# Patient Record
Sex: MALE | Race: WHITE | NOT HISPANIC OR LATINO | URBAN - METROPOLITAN AREA
[De-identification: names, ages, dates, MRNs, and addresses within clinical notes are randomized per-mention and may not be internally consistent; named-entity substitution may affect disease eponyms.]

---

## 2021-04-15 ENCOUNTER — INPATIENT (INPATIENT)
Facility: HOSPITAL | Age: 56
LOS: 0 days | Discharge: HOME | DRG: 309 | End: 2021-04-16
Attending: INTERNAL MEDICINE | Admitting: INTERNAL MEDICINE
Payer: COMMERCIAL

## 2021-04-15 VITALS
SYSTOLIC BLOOD PRESSURE: 91 MMHG | HEIGHT: 69 IN | WEIGHT: 309.97 LBS | TEMPERATURE: 98 F | OXYGEN SATURATION: 97 % | RESPIRATION RATE: 18 BRPM | DIASTOLIC BLOOD PRESSURE: 70 MMHG | HEART RATE: 92 BPM

## 2021-04-15 DIAGNOSIS — Z98.89 OTHER SPECIFIED POSTPROCEDURAL STATES: Chronic | ICD-10-CM

## 2021-04-15 DIAGNOSIS — Z29.9 ENCOUNTER FOR PROPHYLACTIC MEASURES, UNSPECIFIED: ICD-10-CM

## 2021-04-15 DIAGNOSIS — I48.0 PAROXYSMAL ATRIAL FIBRILLATION: ICD-10-CM

## 2021-04-15 DIAGNOSIS — I48.91 UNSPECIFIED ATRIAL FIBRILLATION: ICD-10-CM

## 2021-04-15 DIAGNOSIS — G47.33 OBSTRUCTIVE SLEEP APNEA (ADULT) (PEDIATRIC): ICD-10-CM

## 2021-04-15 DIAGNOSIS — E78.5 HYPERLIPIDEMIA, UNSPECIFIED: ICD-10-CM

## 2021-04-15 DIAGNOSIS — I10 ESSENTIAL (PRIMARY) HYPERTENSION: ICD-10-CM

## 2021-04-15 LAB
ALBUMIN SERPL ELPH-MCNC: 3.7 G/DL — SIGNIFICANT CHANGE UP (ref 3.3–5)
ALP SERPL-CCNC: 76 U/L — SIGNIFICANT CHANGE UP (ref 40–120)
ALT FLD-CCNC: 26 U/L — SIGNIFICANT CHANGE UP (ref 12–78)
ANION GAP SERPL CALC-SCNC: 7 MMOL/L — SIGNIFICANT CHANGE UP (ref 5–17)
AST SERPL-CCNC: 14 U/L — LOW (ref 15–37)
BASOPHILS # BLD AUTO: 0.09 K/UL — SIGNIFICANT CHANGE UP (ref 0–0.2)
BASOPHILS NFR BLD AUTO: 0.8 % — SIGNIFICANT CHANGE UP (ref 0–2)
BILIRUB SERPL-MCNC: 1.1 MG/DL — SIGNIFICANT CHANGE UP (ref 0.2–1.2)
BUN SERPL-MCNC: 16 MG/DL — SIGNIFICANT CHANGE UP (ref 7–23)
CALCIUM SERPL-MCNC: 9 MG/DL — SIGNIFICANT CHANGE UP (ref 8.5–10.1)
CHLORIDE SERPL-SCNC: 107 MMOL/L — SIGNIFICANT CHANGE UP (ref 96–108)
CK SERPL-CCNC: 92 U/L — SIGNIFICANT CHANGE UP (ref 26–308)
CO2 SERPL-SCNC: 28 MMOL/L — SIGNIFICANT CHANGE UP (ref 22–31)
CREAT SERPL-MCNC: 1.1 MG/DL — SIGNIFICANT CHANGE UP (ref 0.5–1.3)
D DIMER BLD IA.RAPID-MCNC: <150 NG/ML DDU — SIGNIFICANT CHANGE UP
EOSINOPHIL # BLD AUTO: 0.22 K/UL — SIGNIFICANT CHANGE UP (ref 0–0.5)
EOSINOPHIL NFR BLD AUTO: 2 % — SIGNIFICANT CHANGE UP (ref 0–6)
GLUCOSE SERPL-MCNC: 101 MG/DL — HIGH (ref 70–99)
HCT VFR BLD CALC: 44 % — SIGNIFICANT CHANGE UP (ref 39–50)
HGB BLD-MCNC: 14.8 G/DL — SIGNIFICANT CHANGE UP (ref 13–17)
IMM GRANULOCYTES NFR BLD AUTO: 0.5 % — SIGNIFICANT CHANGE UP (ref 0–1.5)
LYMPHOCYTES # BLD AUTO: 1.86 K/UL — SIGNIFICANT CHANGE UP (ref 1–3.3)
LYMPHOCYTES # BLD AUTO: 17 % — SIGNIFICANT CHANGE UP (ref 13–44)
MCHC RBC-ENTMCNC: 28.4 PG — SIGNIFICANT CHANGE UP (ref 27–34)
MCHC RBC-ENTMCNC: 33.6 GM/DL — SIGNIFICANT CHANGE UP (ref 32–36)
MCV RBC AUTO: 84.3 FL — SIGNIFICANT CHANGE UP (ref 80–100)
MONOCYTES # BLD AUTO: 0.64 K/UL — SIGNIFICANT CHANGE UP (ref 0–0.9)
MONOCYTES NFR BLD AUTO: 5.9 % — SIGNIFICANT CHANGE UP (ref 2–14)
NEUTROPHILS # BLD AUTO: 8.05 K/UL — HIGH (ref 1.8–7.4)
NEUTROPHILS NFR BLD AUTO: 73.8 % — SIGNIFICANT CHANGE UP (ref 43–77)
NRBC # BLD: 0 /100 WBCS — SIGNIFICANT CHANGE UP (ref 0–0)
PLATELET # BLD AUTO: 257 K/UL — SIGNIFICANT CHANGE UP (ref 150–400)
POTASSIUM SERPL-MCNC: 3.7 MMOL/L — SIGNIFICANT CHANGE UP (ref 3.5–5.3)
POTASSIUM SERPL-SCNC: 3.7 MMOL/L — SIGNIFICANT CHANGE UP (ref 3.5–5.3)
PROT SERPL-MCNC: 7.7 G/DL — SIGNIFICANT CHANGE UP (ref 6–8.3)
RBC # BLD: 5.22 M/UL — SIGNIFICANT CHANGE UP (ref 4.2–5.8)
RBC # FLD: 13.4 % — SIGNIFICANT CHANGE UP (ref 10.3–14.5)
SARS-COV-2 RNA SPEC QL NAA+PROBE: SIGNIFICANT CHANGE UP
SODIUM SERPL-SCNC: 142 MMOL/L — SIGNIFICANT CHANGE UP (ref 135–145)
TROPONIN I SERPL-MCNC: 0.02 NG/ML — SIGNIFICANT CHANGE UP (ref 0.01–0.04)
WBC # BLD: 10.91 K/UL — HIGH (ref 3.8–10.5)
WBC # FLD AUTO: 10.91 K/UL — HIGH (ref 3.8–10.5)

## 2021-04-15 PROCEDURE — 93010 ELECTROCARDIOGRAM REPORT: CPT

## 2021-04-15 PROCEDURE — 71045 X-RAY EXAM CHEST 1 VIEW: CPT | Mod: 26

## 2021-04-15 PROCEDURE — 99222 1ST HOSP IP/OBS MODERATE 55: CPT

## 2021-04-15 PROCEDURE — 99285 EMERGENCY DEPT VISIT HI MDM: CPT

## 2021-04-15 RX ORDER — METOPROLOL TARTRATE 50 MG
1 TABLET ORAL
Qty: 0 | Refills: 0 | DISCHARGE

## 2021-04-15 RX ORDER — FERROUS SULFATE 325(65) MG
325 TABLET ORAL DAILY
Refills: 0 | Status: DISCONTINUED | OUTPATIENT
Start: 2021-04-15 | End: 2021-04-16

## 2021-04-15 RX ORDER — KETOTIFEN FUMARATE 0.34 MG/ML
1 SOLUTION OPHTHALMIC
Refills: 0 | Status: DISCONTINUED | OUTPATIENT
Start: 2021-04-15 | End: 2021-04-16

## 2021-04-15 RX ORDER — DIGOXIN 250 MCG
500 TABLET ORAL ONCE
Refills: 0 | Status: COMPLETED | OUTPATIENT
Start: 2021-04-15 | End: 2021-04-15

## 2021-04-15 RX ORDER — DILTIAZEM HCL 120 MG
10 CAPSULE, EXT RELEASE 24 HR ORAL ONCE
Refills: 0 | Status: COMPLETED | OUTPATIENT
Start: 2021-04-15 | End: 2021-04-15

## 2021-04-15 RX ORDER — ASPIRIN/CALCIUM CARB/MAGNESIUM 324 MG
81 TABLET ORAL DAILY
Refills: 0 | Status: DISCONTINUED | OUTPATIENT
Start: 2021-04-15 | End: 2021-04-16

## 2021-04-15 RX ORDER — AMLODIPINE BESYLATE 2.5 MG/1
1 TABLET ORAL
Qty: 0 | Refills: 0 | DISCHARGE

## 2021-04-15 RX ORDER — ASPIRIN/CALCIUM CARB/MAGNESIUM 324 MG
1 TABLET ORAL
Qty: 0 | Refills: 0 | DISCHARGE

## 2021-04-15 RX ORDER — FERROUS SULFATE 325(65) MG
1 TABLET ORAL
Qty: 0 | Refills: 0 | DISCHARGE

## 2021-04-15 RX ORDER — SODIUM CHLORIDE 9 MG/ML
500 INJECTION INTRAMUSCULAR; INTRAVENOUS; SUBCUTANEOUS
Refills: 0 | Status: COMPLETED | OUTPATIENT
Start: 2021-04-15 | End: 2021-04-15

## 2021-04-15 RX ORDER — DOXAZOSIN MESYLATE 4 MG
2 TABLET ORAL AT BEDTIME
Refills: 0 | Status: DISCONTINUED | OUTPATIENT
Start: 2021-04-15 | End: 2021-04-15

## 2021-04-15 RX ORDER — ATORVASTATIN CALCIUM 80 MG/1
80 TABLET, FILM COATED ORAL AT BEDTIME
Refills: 0 | Status: DISCONTINUED | OUTPATIENT
Start: 2021-04-15 | End: 2021-04-16

## 2021-04-15 RX ORDER — LOSARTAN POTASSIUM 100 MG/1
100 TABLET, FILM COATED ORAL DAILY
Refills: 0 | Status: DISCONTINUED | OUTPATIENT
Start: 2021-04-15 | End: 2021-04-15

## 2021-04-15 RX ORDER — TELMISARTAN AND HYDROCHLOROTHIAZIDE 40; 12.5 MG/1; MG/1
1 TABLET ORAL
Qty: 0 | Refills: 0 | DISCHARGE

## 2021-04-15 RX ORDER — DOXAZOSIN MESYLATE 4 MG
1 TABLET ORAL
Qty: 0 | Refills: 0 | DISCHARGE

## 2021-04-15 RX ORDER — ATORVASTATIN CALCIUM 80 MG/1
1 TABLET, FILM COATED ORAL
Qty: 0 | Refills: 0 | DISCHARGE

## 2021-04-15 RX ORDER — DILTIAZEM HCL 120 MG
15 CAPSULE, EXT RELEASE 24 HR ORAL
Qty: 125 | Refills: 0 | Status: DISCONTINUED | OUTPATIENT
Start: 2021-04-15 | End: 2021-04-16

## 2021-04-15 RX ORDER — APIXABAN 2.5 MG/1
5 TABLET, FILM COATED ORAL EVERY 12 HOURS
Refills: 0 | Status: DISCONTINUED | OUTPATIENT
Start: 2021-04-15 | End: 2021-04-16

## 2021-04-15 RX ORDER — DIGOXIN 250 MCG
250 TABLET ORAL EVERY 6 HOURS
Refills: 0 | Status: COMPLETED | OUTPATIENT
Start: 2021-04-15 | End: 2021-04-15

## 2021-04-15 RX ORDER — SODIUM CHLORIDE 9 MG/ML
1000 INJECTION INTRAMUSCULAR; INTRAVENOUS; SUBCUTANEOUS ONCE
Refills: 0 | Status: COMPLETED | OUTPATIENT
Start: 2021-04-15 | End: 2021-04-15

## 2021-04-15 RX ORDER — ACETAMINOPHEN 500 MG
650 TABLET ORAL EVERY 6 HOURS
Refills: 0 | Status: DISCONTINUED | OUTPATIENT
Start: 2021-04-15 | End: 2021-04-16

## 2021-04-15 RX ORDER — HYDROCHLOROTHIAZIDE 25 MG
25 TABLET ORAL DAILY
Refills: 0 | Status: DISCONTINUED | OUTPATIENT
Start: 2021-04-15 | End: 2021-04-15

## 2021-04-15 RX ORDER — DILTIAZEM HCL 120 MG
10 CAPSULE, EXT RELEASE 24 HR ORAL
Qty: 125 | Refills: 0 | Status: DISCONTINUED | OUTPATIENT
Start: 2021-04-15 | End: 2021-04-15

## 2021-04-15 RX ORDER — OLOPATADINE HYDROCHLORIDE 1 MG/ML
1 SOLUTION/ DROPS OPHTHALMIC
Qty: 0 | Refills: 0 | DISCHARGE

## 2021-04-15 RX ORDER — METOPROLOL TARTRATE 50 MG
25 TABLET ORAL EVERY 6 HOURS
Refills: 0 | Status: DISCONTINUED | OUTPATIENT
Start: 2021-04-15 | End: 2021-04-16

## 2021-04-15 RX ADMIN — ATORVASTATIN CALCIUM 80 MILLIGRAM(S): 80 TABLET, FILM COATED ORAL at 21:28

## 2021-04-15 RX ADMIN — Medication 15 MG/HR: at 19:38

## 2021-04-15 RX ADMIN — Medication 15 MG/HR: at 16:10

## 2021-04-15 RX ADMIN — APIXABAN 5 MILLIGRAM(S): 2.5 TABLET, FILM COATED ORAL at 12:15

## 2021-04-15 RX ADMIN — Medication 10 MILLIGRAM(S): at 10:42

## 2021-04-15 RX ADMIN — Medication 250 MICROGRAM(S): at 18:11

## 2021-04-15 RX ADMIN — KETOTIFEN FUMARATE 1 DROP(S): 0.34 SOLUTION OPHTHALMIC at 18:11

## 2021-04-15 RX ADMIN — SODIUM CHLORIDE 1000 MILLILITER(S): 9 INJECTION INTRAMUSCULAR; INTRAVENOUS; SUBCUTANEOUS at 10:30

## 2021-04-15 RX ADMIN — Medication 500 MICROGRAM(S): at 11:20

## 2021-04-15 RX ADMIN — Medication 10 MILLIGRAM(S): at 10:25

## 2021-04-15 RX ADMIN — Medication 250 MICROGRAM(S): at 23:41

## 2021-04-15 RX ADMIN — SODIUM CHLORIDE 1000 MILLILITER(S): 9 INJECTION INTRAMUSCULAR; INTRAVENOUS; SUBCUTANEOUS at 14:25

## 2021-04-15 RX ADMIN — Medication 25 MILLIGRAM(S): at 18:12

## 2021-04-15 RX ADMIN — SODIUM CHLORIDE 1000 MILLILITER(S): 9 INJECTION INTRAMUSCULAR; INTRAVENOUS; SUBCUTANEOUS at 11:55

## 2021-04-15 RX ADMIN — Medication 10 MG/HR: at 10:50

## 2021-04-15 RX ADMIN — APIXABAN 5 MILLIGRAM(S): 2.5 TABLET, FILM COATED ORAL at 18:12

## 2021-04-15 NOTE — H&P ADULT - NSICDXFAMILYHX_GEN_ALL_CORE_FT
FAMILY HISTORY:  Sibling  Still living? Yes, Estimated age: Age Unknown  Family history of myocardial infarction, Age at diagnosis: Age Unknown

## 2021-04-15 NOTE — H&P ADULT - HISTORY OF PRESENT ILLNESS
Mr Willson is a 56 yo M presenting from home with palpitations found to have A fib. PMH SIMONE on CPAP, HTN, HLD  Patient seen and examined at bedside.       In ER patient was evaluated by cardiology. He was started on Eliquis.    Mr Willson is a 54 yo M presenting from home with palpitations found to have A fib. PMH SIMONE on CPAP, HTN, HLD  Patient seen and examined at bedside.   He states that at approx 3 am today he woke up with palpitations and a feeling of his heart racing. He went back to sleep and then when he woke up he had some dizziness and continued feelings of palpitations so came to ER for evaluation. He denies any known past hx of A fib. He also denies past hx of DM2/CHFMI/TIA/CVA. He denies any associated chest pain. He feels well currently aside form continued feeling of palpitations. Denies light-headedness, dizziness, headaches, nausea, vomiting, chest pain, SOB, abdominal pain, constipation, diarrhea, melena, hematochezia, dysuria.   In ER patient was evaluated by cardiology. He was started on Eliquis, received Digoxin, and started on Cardizem drip.

## 2021-04-15 NOTE — CONSULT NOTE ADULT - SUBJECTIVE AND OBJECTIVE BOX
History of Present Illness: The patient is a 55 year old male with a history of HTN, HL, SIMONE on CPAP who presents with palpitations. He noted the development of palpitations while in bed around 3 am. He went back to sleep and when he woke up, palpitations were still present. He noted some dizziness when he got up. No chest pain or shortness of breath. He sees Dr. Turner as outpatient. He had an echo 6-12 months ago that was normal.    Past Medical/Surgical History:  HTN, HL, SIMONE on CPAP    Medications:  Home Medications:  Benicar HCT 25 mg-40 mg oral tablet: 1 tab(s) orally once a day (15 Nov 2017 13:40)  Benicar HCT 25 mg-40 mg oral tablet: 1 tab(s) orally once a day (31 Jul 2014 02:49)  Lopressor:  orally  (31 Jul 2014 02:49)  metoprolol extended release 25 mg oral tablet, extended release: 1 tab(s) orally once a day (15 Nov 2017 13:40)  Norvasc 10 mg oral tablet: 1 tab(s) orally once a day (31 Jul 2014 02:49)  Norvasc 10 mg oral tablet: 1 tab(s) orally once a day (15 Nov 2017 13:40)  simvastatin 20 mg oral tablet: 1 tab(s) orally once a day (at bedtime) (15 Nov 2017 13:40)  simvastatin 40 mg oral tablet: 1 tab(s) orally once a day (at bedtime) (31 Jul 2014 02:49)      Family History: Non-contributory family history of premature cardiovascular atherosclerotic disease    Social History: No tobacco, alcohol or drug use    Review of Systems:  General: No fevers, chills, weight loss or gain  Skin: No rashes, color changes  Cardiovascular: No chest pain, orthopnea  Respiratory: No shortness of breath, cough  Gastrointestinal: No nausea, abdominal pain  Genitourinary: No incontinence, pain with urination  Musculoskeletal: No pain, swelling, decreased range of motion  Neurological: No headache, weakness  Psychiatric: No depression, anxiety  Endocrine: No weight loss or gain, increased thirst  All other systems are comprehensively negative.    Physical Exam:  Vitals:        Vital Signs Last 24 Hrs  T(C): 36.4 (15 Apr 2021 10:01), Max: 36.4 (15 Apr 2021 10:01)  T(F): 97.6 (15 Apr 2021 10:01), Max: 97.6 (15 Apr 2021 10:01)  HR: 138 (15 Apr 2021 11:25) (92 - 155)  BP: 95/59 (15 Apr 2021 11:25) (84/53 - 100/59)  BP(mean): --  RR: 18 (15 Apr 2021 11:25) (18 - 18)  SpO2: 96% (15 Apr 2021 11:25) (96% - 98%)  General: NAD  HEENT: MMM  Neck: No JVD, no carotid bruit  Lungs: CTAB  CV: RRR, nl S1/S2, no M/R/G  Abdomen: S/NT/ND, +BS  Extremities: No LE edema, no cyanosis  Neuro: AAOx3, non-focal  Skin: No rash    Labs:                        14.8   10.91 )-----------( 257      ( 15 Apr 2021 10:23 )             44.0     04-15    142  |  107  |  16  ----------------------------<  101<H>  3.7   |  28  |  1.10    Ca    9.0      15 Apr 2021 10:23    TPro  7.7  /  Alb  3.7  /  TBili  1.1  /  DBili  x   /  AST  14<L>  /  ALT  26  /  AlkPhos  76  04-15    CARDIAC MARKERS ( 15 Apr 2021 10:23 )  .023 ng/mL / x     / 92 U/L / x     / x              ECG: AF, nonspecific ST abnormality

## 2021-04-15 NOTE — H&P ADULT - NSICDXPASTMEDICALHX_GEN_ALL_CORE_FT
PAST MEDICAL HISTORY:  Acid reflux     Arthritis     Cholesterol blood decreased on meds    HLD (hyperlipidemia)     HTN (hypertension)     HTN (hypertension)     Obesity

## 2021-04-15 NOTE — ED PROVIDER NOTE - PROGRESS NOTE DETAILS
Dw Dr WILBRU Keller - should give dig 0.5 Pt seen by Dr WILBUR doty, admit to tele, he will start eliquis, will cont to monitor. Pt with some persistent tachycardia, will monitor.  Vito Walters ([facility]), will see pt to admit.

## 2021-04-15 NOTE — PATIENT PROFILE ADULT - VISION (WITH CORRECTIVE LENSES IF THE PATIENT USUALLY WEARS THEM):
English
Reading glasses/Partially impaired: cannot see medication labels or newsprint, but can see obstacles in path, and the surrounding layout; can count fingers at arm's length

## 2021-04-15 NOTE — PHARMACOTHERAPY INTERVENTION NOTE - COMMENTS
Patient on Eliquis 5 mg BID for atrial fibrillation. Reached out to MD (Dr. Keller) to discuss patient receiving stat dose. MD approved stat dose and ok with next dose being given at 1800 tonight. Also discussed patient's BMI = 45 kg/m2. In patient's with BMI >40 kg/m2, DOAC therapy not extensively studied and not generally recommended. MD aware and would like to continue with medication.

## 2021-04-15 NOTE — ED PROVIDER NOTE - CARE PLAN
Principal Discharge DX:	New onset atrial fibrillation  Secondary Diagnosis:	Rapid atrial fibrillation

## 2021-04-15 NOTE — H&P ADULT - ASSESSMENT
Mr Willson is a 56 yo M presenting from home with palpitations found to have A fib. PMH SIMONE on CPAP, HTN, HLD

## 2021-04-15 NOTE — CONSULT NOTE ADULT - SUBJECTIVE AND OBJECTIVE BOX
REASON FOR CONSULT: New onset afib w/ RVR    CONSULT REQUESTED BY:     Patient is a 55y old  Male Pmhx morbidly obese, HTN, HLD who presents with a chief complaint of A fib with RVR (15 Apr 2021 11:56). Pt states he woke up at 2am with palpitations. Denies sob, chest pain, fever, chills, N/V/D, or sick contacts. Will in ED, he was given Cardizem 10mg IVX1, Digoxin .5mg, and now on Cardizem drip. 's. Pt seen and examined. Appears comfortable. Critical care called for further management.          PAST MEDICAL & SURGICAL HISTORY:  HTN (hypertension)    Cholesterol blood decreased  on meds    HTN (hypertension)    Acid reflux    Arthritis    HLD (hyperlipidemia)    Obesity    S/P knee surgery  both    H/O knee surgery      Allergies    No Known Allergies    Intolerances      FAMILY HISTORY:  Family history of myocardial infarction (Sibling)  52        Review of Systems:  CONSTITUTIONAL: No fever, chills, or fatigue  EYES: No eye pain, visual disturbances, or discharge  ENMT:  No difficulty hearing, tinnitus, vertigo; No sinus or throat pain  NECK: No pain or stiffness  RESPIRATORY: No cough, wheezing, chills or hemoptysis; No shortness of breath  CARDIOVASCULAR: No chest pain, + palpitations, +dizziness, or leg swelling  GASTROINTESTINAL: No abdominal or epigastric pain. No nausea, vomiting, or hematemesis; No diarrhea or constipation. No melena or hematochezia.  GENITOURINARY: No dysuria, frequency, hematuria, or incontinence  NEUROLOGICAL: No headaches, memory loss, loss of strength, numbness, or tremors  SKIN: No itching, burning, rashes, or lesions   MUSCULOSKELETAL: No joint pain or swelling; No muscle, back, or extremity pain  PSYCHIATRIC: No depression, anxiety, mood swings, or difficulty sleeping      Medications:    digoxin  Injectable 250 MICROGram(s) IV Push every 6 hours  diltiazem Infusion 15 mG/Hr IV Continuous <Continuous>      acetaminophen   Tablet .. 650 milliGRAM(s) Oral every 6 hours PRN      apixaban 5 milliGRAM(s) Oral every 12 hours  aspirin  chewable 81 milliGRAM(s) Oral daily        atorvastatin 80 milliGRAM(s) Oral at bedtime    ferrous    sulfate 325 milliGRAM(s) Oral daily      ketotifen 0.025% Ophthalmic Solution 1 Drop(s) Both EYES two times a day            ICU Vital Signs Last 24 Hrs  T(C): 37.2 (15 Apr 2021 15:53), Max: 37.2 (15 Apr 2021 15:53)  T(F): 98.9 (15 Apr 2021 15:53), Max: 98.9 (15 Apr 2021 15:53)  HR: 115 (15 Apr 2021 15:53) (92 - 155)  BP: 114/64 (15 Apr 2021 15:53) (80/54 - 114/64)  BP(mean): --  ABP: --  ABP(mean): --  RR: 18 (15 Apr 2021 15:53) (18 - 18)  SpO2: 99% (15 Apr 2021 15:53) (95% - 99%)    Vital Signs Last 24 Hrs  T(C): 37.2 (15 Apr 2021 15:53), Max: 37.2 (15 Apr 2021 15:53)  T(F): 98.9 (15 Apr 2021 15:53), Max: 98.9 (15 Apr 2021 15:53)  HR: 115 (15 Apr 2021 15:53) (92 - 155)  BP: 114/64 (15 Apr 2021 15:53) (80/54 - 114/64)  BP(mean): --  RR: 18 (15 Apr 2021 15:53) (18 - 18)  SpO2: 99% (15 Apr 2021 15:53) (95% - 99%)        I&O's Detail    15 Apr 2021 07:01  -  15 Apr 2021 16:17  --------------------------------------------------------  IN:    sodium chloride 0.9%: 500 mL    Sodium Chloride 0.9% Bolus: 1000 mL  Total IN: 1500 mL    OUT:    Estimated Blood Loss (mL): 300 mL  Total OUT: 300 mL    Total NET: 1200 mL            LABS:                        14.8   10.91 )-----------( 257      ( 15 Apr 2021 10:23 )             44.0     04-15    142  |  107  |  16  ----------------------------<  101<H>  3.7   |  28  |  1.10    Ca    9.0      15 Apr 2021 10:23    TPro  7.7  /  Alb  3.7  /  TBili  1.1  /  DBili  x   /  AST  14<L>  /  ALT  26  /  AlkPhos  76  04-15      CARDIAC MARKERS ( 15 Apr 2021 10:23 )  .023 ng/mL / x     / 92 U/L / x     / x          CAPILLARY BLOOD GLUCOSE            CULTURES:      Physical Examination:    General: No acute distress.  Alert, oriented, interactive, nonfocal    HEENT: Pupils equal, reactive to light.  Symmetric.    PULM: Clear to auscultation bilaterally, no significant sputum production    CVS: tachycardic irreg     ABD: Soft, nondistended, nontender, normoactive bowel sounds, no masses    EXT: No edema, nontender    SKIN: Warm and well perfused, no rashes noted.    RADIOLOGY:   CXR>  Heart magnified by technique.    The lung fields and pleural surfaces are unremarkable.    Chest is similar to July 31, 2014.    IMPRESSION: No acute finding.    A/P 55y old  Male Pmhx morbidly obese, HTN, HLD who presents with a chief complaint of new onset Afib with RVR. Cardizem IV and Digoxin IV given with minimal response. BP stable while cardizem drip. No indication for Critical care at this time. Stable for Tele.  Cardio eval appreciated. Woud give lopressor 5mg IvX1 now, then 25mg PO q6hrs. Apixaban started. Plan as per Cardiology.     GOC discussed with patient.     Discussed w/ Dr Hackett                                                                                                *  36min encounter (Reviewing data, imaging, discussing with multidisciplinary team, non inclusive of procedures, discussing goals of care with patient/family)    REASON FOR CONSULT: New onset afib w/ RVR    CONSULT REQUESTED BY:     Patient is a 55y old  Male Pmhx morbidly obese, HTN, HLD who presents with a chief complaint of A fib with RVR (15 Apr 2021 11:56). Pt states he woke up at 2am with palpitations. Denies sob, chest pain, fever, chills, N/V/D, or sick contacts. Will in ED, he was given Cardizem 10mg IVX1, Digoxin .5mg, and now on Cardizem drip. 's. Pt seen and examined. Appears comfortable. Critical care called for further management.          PAST MEDICAL & SURGICAL HISTORY:  HTN (hypertension)    Cholesterol blood decreased  on meds    HTN (hypertension)    Acid reflux    Arthritis    HLD (hyperlipidemia)    Obesity    S/P knee surgery  both    H/O knee surgery      Allergies    No Known Allergies    Intolerances      FAMILY HISTORY:  Family history of myocardial infarction (Sibling)  52        Review of Systems:  CONSTITUTIONAL: No fever, chills, or fatigue  EYES: No eye pain, visual disturbances, or discharge  ENMT:  No difficulty hearing, tinnitus, vertigo; No sinus or throat pain  NECK: No pain or stiffness  RESPIRATORY: No cough, wheezing, chills or hemoptysis; No shortness of breath  CARDIOVASCULAR: No chest pain, + palpitations, +dizziness, or leg swelling  GASTROINTESTINAL: No abdominal or epigastric pain. No nausea, vomiting, or hematemesis; No diarrhea or constipation. No melena or hematochezia.  GENITOURINARY: No dysuria, frequency, hematuria, or incontinence  NEUROLOGICAL: No headaches, memory loss, loss of strength, numbness, or tremors  SKIN: No itching, burning, rashes, or lesions   MUSCULOSKELETAL: No joint pain or swelling; No muscle, back, or extremity pain  PSYCHIATRIC: No depression, anxiety, mood swings, or difficulty sleeping      Medications:    digoxin  Injectable 250 MICROGram(s) IV Push every 6 hours  diltiazem Infusion 15 mG/Hr IV Continuous <Continuous>      acetaminophen   Tablet .. 650 milliGRAM(s) Oral every 6 hours PRN      apixaban 5 milliGRAM(s) Oral every 12 hours  aspirin  chewable 81 milliGRAM(s) Oral daily        atorvastatin 80 milliGRAM(s) Oral at bedtime    ferrous    sulfate 325 milliGRAM(s) Oral daily      ketotifen 0.025% Ophthalmic Solution 1 Drop(s) Both EYES two times a day            ICU Vital Signs Last 24 Hrs  T(C): 37.2 (15 Apr 2021 15:53), Max: 37.2 (15 Apr 2021 15:53)  T(F): 98.9 (15 Apr 2021 15:53), Max: 98.9 (15 Apr 2021 15:53)  HR: 115 (15 Apr 2021 15:53) (92 - 155)  BP: 114/64 (15 Apr 2021 15:53) (80/54 - 114/64)  BP(mean): --  ABP: --  ABP(mean): --  RR: 18 (15 Apr 2021 15:53) (18 - 18)  SpO2: 99% (15 Apr 2021 15:53) (95% - 99%)    Vital Signs Last 24 Hrs  T(C): 37.2 (15 Apr 2021 15:53), Max: 37.2 (15 Apr 2021 15:53)  T(F): 98.9 (15 Apr 2021 15:53), Max: 98.9 (15 Apr 2021 15:53)  HR: 115 (15 Apr 2021 15:53) (92 - 155)  BP: 114/64 (15 Apr 2021 15:53) (80/54 - 114/64)  BP(mean): --  RR: 18 (15 Apr 2021 15:53) (18 - 18)  SpO2: 99% (15 Apr 2021 15:53) (95% - 99%)        I&O's Detail    15 Apr 2021 07:01  -  15 Apr 2021 16:17  --------------------------------------------------------  IN:    sodium chloride 0.9%: 500 mL    Sodium Chloride 0.9% Bolus: 1000 mL  Total IN: 1500 mL    OUT:    Estimated Blood Loss (mL): 300 mL  Total OUT: 300 mL    Total NET: 1200 mL            LABS:                        14.8   10.91 )-----------( 257      ( 15 Apr 2021 10:23 )             44.0     04-15    142  |  107  |  16  ----------------------------<  101<H>  3.7   |  28  |  1.10    Ca    9.0      15 Apr 2021 10:23    TPro  7.7  /  Alb  3.7  /  TBili  1.1  /  DBili  x   /  AST  14<L>  /  ALT  26  /  AlkPhos  76  04-15      CARDIAC MARKERS ( 15 Apr 2021 10:23 )  .023 ng/mL / x     / 92 U/L / x     / x          CAPILLARY BLOOD GLUCOSE            CULTURES:      Physical Examination:    General: No acute distress.  Alert, oriented, interactive, nonfocal    HEENT: Pupils equal, reactive to light.  Symmetric.    PULM: Clear to auscultation bilaterally, no significant sputum production    CVS: tachycardic irreg     ABD: Soft, nondistended, nontender, normoactive bowel sounds, no masses    EXT: No edema, nontender    SKIN: Warm and well perfused, no rashes noted.    RADIOLOGY:   CXR>  Heart magnified by technique.    The lung fields and pleural surfaces are unremarkable.    Chest is similar to July 31, 2014.    IMPRESSION: No acute finding.    A/P 55y old  Male Pmhx morbidly obese, HTN, HLD who presents with a chief complaint of new onset Afib with RVR. Cardizem IV and Digoxin IV given with minimal response. BP stable while cardizem drip. No indication for Critical care at this time. Stable for Tele.  Cardio eval appreciated. Woud give lopressor 5mg IvX1 now, then 25mg PO q6hrs. Apixaban started. Plan as per Cardiology.     GOC discussed with patient.     Discussed w/ Dr Hackett and Dr Walters                                                                                                *  36min encounter (Reviewing data, imaging, discussing with multidisciplinary team, non inclusive of procedures, discussing goals of care with patient/family)

## 2021-04-15 NOTE — ED PROVIDER NOTE - CLINICAL SUMMARY MEDICAL DECISION MAKING FREE TEXT BOX
Palpitations with slight dizziness - HR ~ 160s, rapid afib (new onset). check labs, xr, dimer, cardio, tba

## 2021-04-15 NOTE — ED PROVIDER NOTE - OBJECTIVE STATEMENT
56 yo M p/w co palpitations since ~ 2am today. Pt went back to sleep and awoke this am , still feeling these sx. No chest pain. no sob. pt with slight dizziness when standing quickly. no numb/ting/focal weak. no recent illness. no abd pain. no n/v/d. No neck / back pain. no weakness. No prior events. no neck / back pain. Pt fully vaccinated to covid. no recent exposures. no agg/allev factors. no other inj or co.

## 2021-04-15 NOTE — PATIENT PROFILE ADULT - FALL HARM RISK
Arthritis in the bilateral knees/bones(Osteoporosis,prev fx,steroid use,metastatic bone ca)/coagulation(Bleeding disorder R/T clinical cond/anti-coags)

## 2021-04-15 NOTE — H&P ADULT - NSICDXPROBLEMPLAN1_GEN_ALL_CORE_FT
New Onset A fib.   -started on Cardizem gtt.  -Patient was hypotensive but without any symptoms of chest pain, light-headedness, dizziness.   -plan to transition to Metoprolol as recommended by cardiology once BP improves  -started on Eliquis  -risks and benefits discussed  -monitor on telemetry  -check TTE and TSH New Onset A fib.   -started on Cardizem gtt.  -Patient was hypotensive but without any symptoms of chest pain, light-headedness, dizziness.   -plan to transition to Metoprolol as recommended by cardiology once BP improves  -started on Eliquis  -risks and benefits discussed  -monitor on telemetry  -check TTE and TSH  -will obtain ICU consult due to persistent hypotension and bolus additional 500cc Normal saline. patient remains asymptomatic despite being hypotensive

## 2021-04-15 NOTE — ED ADULT NURSE NOTE - OBJECTIVE STATEMENT
Pt A&Ox4, ambulatory to ED c/o palpitations.  Pt states he woke up today around 0200 and felt as if his heart was racing.  Pt denies chest pain, N/V, back pain, shortness of breath.  Pt has been experiencing dizziness with standing.

## 2021-04-15 NOTE — CONSULT NOTE ADULT - ASSESSMENT
The patient is a 55 year old male with a history of HTN, HL, SIMONE on CPAP who presents with palpitations in the setting of new rapid atrial fibrillation.    Plan:  - ECG otherwise with nonspecific findings  - D-dimer negative  - Cardiac enzymes negative  - Patient received diltiazem 20 mg IV with little effect  - Continue diltiazem drip at 10 mg/hr and titrate up as tolerated  - BP on lower side  - Received digoxin 0.5 mg IV. Will continue load with digoxin 0.25 mg IV q6h x2 doses.  - If SBP improves greater than 100 mmHg, start metoprolol tartrate 25 mg q6h  - Start apixaban 5 mg bid  - Check echo  - Check TSH

## 2021-04-15 NOTE — H&P ADULT - NSHPPHYSICALEXAM_GEN_ALL_CORE
T(C): 36.4 (04-15-21 @ 10:01), Max: 36.4 (04-15-21 @ 10:01)  HR: 136 (04-15-21 @ 11:55) (92 - 155)  BP: 80/54 (04-15-21 @ 11:55) (80/54 - 100/59)  RR: 18 (04-15-21 @ 11:55) (18 - 18)  SpO2: 98% (04-15-21 @ 11:55) (96% - 98%)  Wt(kg): --    Physical Exam:   GENERAL: well-groomed, well-developed, NAD  HEENT: head NC/AT; EOM intact, conjunctiva & sclera clear; hearing grossly intact, moist mucous membranes  NECK: supple, no JVD  RESPIRATORY: CTA B/L, no wheezing, rales, rhonchi or rubs  CARDIOVASCULAR: S1&S2, +tachy, irreg irreg, no murmurs or gallops  ABDOMEN: soft, non-tender, non-distended, + Bowel sounds x4 quadrants, no guarding, rebound or rigidity  MUSCULOSKELETAL:  no clubbing, cyanosis or edema of all 4 extremities  LYMPH: no cervical lymphadenopathy  VASCULAR: Radial pulses 2+ bilaterally, no varicose veins   SKIN: warm and dry, color normal  NEUROLOGIC: AA&O X3, CN2-12 intact w/ no focal deficits, no sensory loss, motor Strength 5/5 in UE & LE B/L  Psych: Normal mood and affect, normal behavior

## 2021-04-15 NOTE — ED ADULT NURSE NOTE - PMH
Acid reflux    Arthritis    Cholesterol blood decreased  on meds  HLD (hyperlipidemia)    HTN (hypertension)    HTN (hypertension)    Obesity

## 2021-04-16 VITALS
TEMPERATURE: 98 F | HEART RATE: 61 BPM | OXYGEN SATURATION: 93 % | DIASTOLIC BLOOD PRESSURE: 71 MMHG | RESPIRATION RATE: 20 BRPM | SYSTOLIC BLOOD PRESSURE: 111 MMHG

## 2021-04-16 LAB
ANION GAP SERPL CALC-SCNC: 8 MMOL/L — SIGNIFICANT CHANGE UP (ref 5–17)
BASOPHILS # BLD AUTO: 0.1 K/UL — SIGNIFICANT CHANGE UP (ref 0–0.2)
BASOPHILS NFR BLD AUTO: 1.1 % — SIGNIFICANT CHANGE UP (ref 0–2)
BUN SERPL-MCNC: 14 MG/DL — SIGNIFICANT CHANGE UP (ref 7–23)
CALCIUM SERPL-MCNC: 8.3 MG/DL — LOW (ref 8.5–10.1)
CHLORIDE SERPL-SCNC: 110 MMOL/L — HIGH (ref 96–108)
CO2 SERPL-SCNC: 25 MMOL/L — SIGNIFICANT CHANGE UP (ref 22–31)
COVID-19 SPIKE DOMAIN AB INTERP: POSITIVE
COVID-19 SPIKE DOMAIN ANTIBODY RESULT: >250 U/ML — HIGH
CREAT SERPL-MCNC: 0.83 MG/DL — SIGNIFICANT CHANGE UP (ref 0.5–1.3)
EOSINOPHIL # BLD AUTO: 0.25 K/UL — SIGNIFICANT CHANGE UP (ref 0–0.5)
EOSINOPHIL NFR BLD AUTO: 2.7 % — SIGNIFICANT CHANGE UP (ref 0–6)
GLUCOSE SERPL-MCNC: 88 MG/DL — SIGNIFICANT CHANGE UP (ref 70–99)
HCT VFR BLD CALC: 42.1 % — SIGNIFICANT CHANGE UP (ref 39–50)
HCV AB S/CO SERPL IA: 0.09 S/CO — SIGNIFICANT CHANGE UP (ref 0–0.99)
HCV AB SERPL-IMP: SIGNIFICANT CHANGE UP
HGB BLD-MCNC: 14.1 G/DL — SIGNIFICANT CHANGE UP (ref 13–17)
IMM GRANULOCYTES NFR BLD AUTO: 0.3 % — SIGNIFICANT CHANGE UP (ref 0–1.5)
LYMPHOCYTES # BLD AUTO: 2.07 K/UL — SIGNIFICANT CHANGE UP (ref 1–3.3)
LYMPHOCYTES # BLD AUTO: 22.3 % — SIGNIFICANT CHANGE UP (ref 13–44)
MCHC RBC-ENTMCNC: 28.7 PG — SIGNIFICANT CHANGE UP (ref 27–34)
MCHC RBC-ENTMCNC: 33.5 GM/DL — SIGNIFICANT CHANGE UP (ref 32–36)
MCV RBC AUTO: 85.6 FL — SIGNIFICANT CHANGE UP (ref 80–100)
MONOCYTES # BLD AUTO: 0.52 K/UL — SIGNIFICANT CHANGE UP (ref 0–0.9)
MONOCYTES NFR BLD AUTO: 5.6 % — SIGNIFICANT CHANGE UP (ref 2–14)
NEUTROPHILS # BLD AUTO: 6.31 K/UL — SIGNIFICANT CHANGE UP (ref 1.8–7.4)
NEUTROPHILS NFR BLD AUTO: 68 % — SIGNIFICANT CHANGE UP (ref 43–77)
NRBC # BLD: 0 /100 WBCS — SIGNIFICANT CHANGE UP (ref 0–0)
PLATELET # BLD AUTO: 217 K/UL — SIGNIFICANT CHANGE UP (ref 150–400)
POTASSIUM SERPL-MCNC: 3.8 MMOL/L — SIGNIFICANT CHANGE UP (ref 3.5–5.3)
POTASSIUM SERPL-SCNC: 3.8 MMOL/L — SIGNIFICANT CHANGE UP (ref 3.5–5.3)
RBC # BLD: 4.92 M/UL — SIGNIFICANT CHANGE UP (ref 4.2–5.8)
RBC # FLD: 13.5 % — SIGNIFICANT CHANGE UP (ref 10.3–14.5)
SARS-COV-2 IGG+IGM SERPL QL IA: >250 U/ML — HIGH
SARS-COV-2 IGG+IGM SERPL QL IA: POSITIVE
SODIUM SERPL-SCNC: 143 MMOL/L — SIGNIFICANT CHANGE UP (ref 135–145)
TSH SERPL-MCNC: 2.18 UIU/ML — SIGNIFICANT CHANGE UP (ref 0.36–3.74)
WBC # BLD: 9.28 K/UL — SIGNIFICANT CHANGE UP (ref 3.8–10.5)
WBC # FLD AUTO: 9.28 K/UL — SIGNIFICANT CHANGE UP (ref 3.8–10.5)

## 2021-04-16 PROCEDURE — 86803 HEPATITIS C AB TEST: CPT

## 2021-04-16 PROCEDURE — U0005: CPT

## 2021-04-16 PROCEDURE — 71045 X-RAY EXAM CHEST 1 VIEW: CPT

## 2021-04-16 PROCEDURE — 84484 ASSAY OF TROPONIN QUANT: CPT

## 2021-04-16 PROCEDURE — 96374 THER/PROPH/DIAG INJ IV PUSH: CPT

## 2021-04-16 PROCEDURE — 85379 FIBRIN DEGRADATION QUANT: CPT

## 2021-04-16 PROCEDURE — 93306 TTE W/DOPPLER COMPLETE: CPT

## 2021-04-16 PROCEDURE — 36415 COLL VENOUS BLD VENIPUNCTURE: CPT

## 2021-04-16 PROCEDURE — 94660 CPAP INITIATION&MGMT: CPT

## 2021-04-16 PROCEDURE — 86769 SARS-COV-2 COVID-19 ANTIBODY: CPT

## 2021-04-16 PROCEDURE — 94760 N-INVAS EAR/PLS OXIMETRY 1: CPT

## 2021-04-16 PROCEDURE — 99285 EMERGENCY DEPT VISIT HI MDM: CPT

## 2021-04-16 PROCEDURE — 85025 COMPLETE CBC W/AUTO DIFF WBC: CPT

## 2021-04-16 PROCEDURE — 96361 HYDRATE IV INFUSION ADD-ON: CPT

## 2021-04-16 PROCEDURE — 93005 ELECTROCARDIOGRAM TRACING: CPT

## 2021-04-16 PROCEDURE — 84443 ASSAY THYROID STIM HORMONE: CPT

## 2021-04-16 PROCEDURE — U0003: CPT

## 2021-04-16 PROCEDURE — 96375 TX/PRO/DX INJ NEW DRUG ADDON: CPT

## 2021-04-16 PROCEDURE — 99233 SBSQ HOSP IP/OBS HIGH 50: CPT

## 2021-04-16 PROCEDURE — 80048 BASIC METABOLIC PNL TOTAL CA: CPT

## 2021-04-16 PROCEDURE — 82550 ASSAY OF CK (CPK): CPT

## 2021-04-16 PROCEDURE — 80053 COMPREHEN METABOLIC PANEL: CPT

## 2021-04-16 RX ORDER — METOPROLOL TARTRATE 50 MG
200 TABLET ORAL DAILY
Refills: 0 | Status: DISCONTINUED | OUTPATIENT
Start: 2021-04-17 | End: 2021-04-16

## 2021-04-16 RX ORDER — SODIUM CHLORIDE 9 MG/ML
500 INJECTION INTRAMUSCULAR; INTRAVENOUS; SUBCUTANEOUS ONCE
Refills: 0 | Status: COMPLETED | OUTPATIENT
Start: 2021-04-16 | End: 2021-04-16

## 2021-04-16 RX ADMIN — SODIUM CHLORIDE 500 MILLILITER(S): 9 INJECTION INTRAMUSCULAR; INTRAVENOUS; SUBCUTANEOUS at 01:18

## 2021-04-16 RX ADMIN — Medication 81 MILLIGRAM(S): at 13:16

## 2021-04-16 RX ADMIN — APIXABAN 5 MILLIGRAM(S): 2.5 TABLET, FILM COATED ORAL at 05:33

## 2021-04-16 RX ADMIN — KETOTIFEN FUMARATE 1 DROP(S): 0.34 SOLUTION OPHTHALMIC at 05:33

## 2021-04-16 RX ADMIN — Medication 325 MILLIGRAM(S): at 13:16

## 2021-04-16 NOTE — CHART NOTE - NSCHARTNOTEFT_GEN_A_CORE
Called by RN for Pt c/o hypotension on cardizem gtt.   Tele: A fib 114 (100- 144)     T(C): 36.4 (04-15-21 @ 23:39), Max: 37.2 (04-15-21 @ 15:53)  HR: 101 (04-15-21 @ 23:39) (78 - 155)  BP: 97/61 (04-16-21 @ 00:33) (80/54 - 132/67)  RR: 18 (04-15-21 @ 23:39) (18 - 19)  SpO2: 94% (04-15-21 @ 23:39) (90% - 99%)  Wt(kg): --      LABS:                        14.8   10.91 )-----------( 257      ( 15 Apr 2021 10:23 )             44.0     04-15    142  |  107  |  16  ----------------------------<  101<H>  3.7   |  28  |  1.10    Ca    9.0      15 Apr 2021 10:23    TPro  7.7  /  Alb  3.7  /  TBili  1.1  /  DBili  x   /  AST  14<L>  /  ALT  26  /  AlkPhos  76  04-15          CARDIAC MARKERS ( 15 Apr 2021 10:23 )  .023 ng/mL / x     / 92 U/L / x     / x            Assessment/Plan  Mr Willson is a 54 yo M presenting from home with palpitations found to have A fib. PMH SIMONE on CPAP, HTN, HLD now hypotensive  1. will give 500 cc normal saline bolus   2. recheck BP in 1 hour  3. hold PM lopressor until BP can tolerate   4. RN to call if any changes Called by RN for Pt c/o hypotension on cardizem gtt.   Tele: A fib 114 (100- 144)     BP: 97/51; PO lopressor held due to parameters    T(C): 36.4 (04-15-21 @ 23:39), Max: 37.2 (04-15-21 @ 15:53)  HR: 101 (04-15-21 @ 23:39) (78 - 155)  BP: 97/61 (04-16-21 @ 00:33) (80/54 - 132/67)  RR: 18 (04-15-21 @ 23:39) (18 - 19)  SpO2: 94% (04-15-21 @ 23:39) (90% - 99%)  Wt(kg): --      LABS:                        14.8   10.91 )-----------( 257      ( 15 Apr 2021 10:23 )             44.0     04-15    142  |  107  |  16  ----------------------------<  101<H>  3.7   |  28  |  1.10    Ca    9.0      15 Apr 2021 10:23    TPro  7.7  /  Alb  3.7  /  TBili  1.1  /  DBili  x   /  AST  14<L>  /  ALT  26  /  AlkPhos  76  04-15          CARDIAC MARKERS ( 15 Apr 2021 10:23 )  .023 ng/mL / x     / 92 U/L / x     / x            Assessment/Plan  Mr Willson is a 56 yo M presenting from home with palpitations found to have A fib. PMH SIMONE on CPAP, HTN, HLD now hypotensive  1. will give 500 cc normal saline bolus   2. recheck BP in 1 hour  3. hold PM lopressor until BP can tolerate   4. RN to call if any changes

## 2021-04-16 NOTE — DISCHARGE NOTE PROVIDER - CARE PROVIDER_API CALL
Orestes Mccormick  INTERNAL MEDICINE  [address] [place] [address]  Phone: [phone]  Fax: [phone]  Follow Up Time:     Ubaldo Keller)  Cardiovascular Disease; Internal Medicine  [address], [address]  [place] [address]  Phone: [phone]  Fax: [phone]  Follow Up Time:

## 2021-04-16 NOTE — PROVIDER CONTACT NOTE (OTHER) - ASSESSMENT
Pt sleeping comfortably, easily arousable, offers no complaints.   VS: Pt sleeping comfortably, easily arousable, offers no complaints.   VS: T97.6 HR 54 /76 RR 18 SpO2 94% on cpap

## 2021-04-16 NOTE — DISCHARGE NOTE PROVIDER - NSDCCPCAREPLAN_GEN_ALL_CORE_FT
PRINCIPAL DISCHARGE DIAGNOSIS  Diagnosis: New onset atrial fibrillation  Assessment and Plan of Treatment: You came into the hospital with an abnormal heart rhythm called atrial fibrilation. You were seen by our cardiology team. You were monitored on telemetry monitor. Your heart rhythm converted back to normal. You are now being discharged back on your home metoprolol. You will need to follow up with your outpatient cardiologist after discharge.      SECONDARY DISCHARGE DIAGNOSES  Diagnosis: Rapid atrial fibrillation  Assessment and Plan of Treatment:

## 2021-04-16 NOTE — PROGRESS NOTE ADULT - SUBJECTIVE AND OBJECTIVE BOX
Patient is a 55y old  Male who presents with a chief complaint of A fib with RVR (15 Apr 2021 16:16)      INTERVAL HPI/OVERNIGHT EVENTS:  Patient seen and examined at bedside. Denies any complaints.  Patient converted back to NSR overnight. Now off of cardizem ggt.     MEDICATIONS  (STANDING):  aspirin  chewable 81 milliGRAM(s) Oral daily  atorvastatin 80 milliGRAM(s) Oral at bedtime  ferrous    sulfate 325 milliGRAM(s) Oral daily  ketotifen 0.025% Ophthalmic Solution 1 Drop(s) Both EYES two times a day    MEDICATIONS  (PRN):  acetaminophen   Tablet .. 650 milliGRAM(s) Oral every 6 hours PRN Mild Pain (1 - 3)      Allergies    No Known Allergies    Intolerances        REVIEW OF SYSTEMS:  CONSTITUTIONAL: No fever or chills  HEENT:  No headache, no sore throat  RESPIRATORY: No cough, wheezing, or shortness of breath  CARDIOVASCULAR: No chest pain, palpitations  GASTROINTESTINAL: No abd pain, nausea, vomiting, or diarrhea  GENITOURINARY: No dysuria, frequency, or hematuria  NEUROLOGICAL: no focal weakness or dizziness  MUSCULOSKELETAL: no myalgias     Vital Signs Last 24 Hrs  T(C): 36.7 (16 Apr 2021 04:50), Max: 37.2 (15 Apr 2021 15:53)  T(F): 98.1 (16 Apr 2021 04:50), Max: 98.9 (15 Apr 2021 15:53)  HR: 60 (16 Apr 2021 08:16) (54 - 155)  BP: 93/62 (16 Apr 2021 04:50) (80/54 - 132/67)  BP(mean): --  RR: 19 (16 Apr 2021 04:50) (18 - 19)  SpO2: 95% (16 Apr 2021 08:16) (90% - 99%)    PHYSICAL EXAM:  GENERAL: NAD  HEENT:  NC/AT  CHEST/LUNG:  CTA b/l  HEART:  RRR, S1, S2  ABDOMEN:  Soft, nontender, nondistended  EXTREMITIES: no edema, cyanosis, or calf tenderness  NERVOUS SYSTEM: answers questions and follows commands appropriately    LABS:                        14.1   9.28  )-----------( 217      ( 16 Apr 2021 07:15 )             42.1     CBC Full  -  ( 16 Apr 2021 07:15 )  WBC Count : 9.28 K/uL  Hemoglobin : 14.1 g/dL  Hematocrit : 42.1 %  Platelet Count - Automated : 217 K/uL  Mean Cell Volume : 85.6 fl  Mean Cell Hemoglobin : 28.7 pg  Mean Cell Hemoglobin Concentration : 33.5 gm/dL  Auto Neutrophil # : 6.31 K/uL  Auto Lymphocyte # : 2.07 K/uL  Auto Monocyte # : 0.52 K/uL  Auto Eosinophil # : 0.25 K/uL  Auto Basophil # : 0.10 K/uL  Auto Neutrophil % : 68.0 %  Auto Lymphocyte % : 22.3 %  Auto Monocyte % : 5.6 %  Auto Eosinophil % : 2.7 %  Auto Basophil % : 1.1 %    16 Apr 2021 07:15    143    |  110    |  14     ----------------------------<  88     3.8     |  25     |  0.83     Ca    8.3        16 Apr 2021 07:15    TPro  7.7    /  Alb  3.7    /  TBili  1.1    /  DBili  x      /  AST  14     /  ALT  26     /  AlkPhos  76     15 Apr 2021 10:23        CAPILLARY BLOOD GLUCOSE              RADIOLOGY & ADDITIONAL TESTS:    Personally reviewed.     Consultant(s) Notes Reviewed:  [x] YES  [ ] NO    
Chief Complaint: Palpitations    Interval Events: Converted back to sinus rhythm overnight.    Review of Systems:  General: No fevers, chills, weight loss or gain  Skin: No rashes, color changes  Cardiovascular: No chest pain, orthopnea  Respiratory: No shortness of breath, cough  Gastrointestinal: No nausea, abdominal pain  Genitourinary: No incontinence, pain with urination  Musculoskeletal: No pain, swelling, decreased range of motion  Neurological: No headache, weakness  Psychiatric: No depression, anxiety  Endocrine: No weight loss or gain, increased thirst  All other systems are comprehensively negative.    Physical Exam:  Vitals:        Vital Signs Last 24 Hrs  T(C): 36.7 (16 Apr 2021 04:50), Max: 37.2 (15 Apr 2021 15:53)  T(F): 98.1 (16 Apr 2021 04:50), Max: 98.9 (15 Apr 2021 15:53)  HR: 59 (16 Apr 2021 04:50) (54 - 155)  BP: 93/62 (16 Apr 2021 04:50) (80/54 - 132/67)  BP(mean): --  RR: 19 (16 Apr 2021 04:50) (18 - 19)  SpO2: 98% (16 Apr 2021 04:50) (90% - 99%)  General: NAD  HEENT: MMM  Neck: No JVD, no carotid bruit  Lungs: CTAB  CV: RRR, nl S1/S2, no M/R/G  Abdomen: S/NT/ND, +BS  Extremities: No LE edema, no cyanosis  Neuro: AAOx3, non-focal  Skin: No rash    Labs:                        14.1   9.28  )-----------( 217      ( 16 Apr 2021 07:15 )             42.1     04-16    143  |  110<H>  |  14  ----------------------------<  88  3.8   |  25  |  0.83    Ca    8.3<L>      16 Apr 2021 07:15    TPro  7.7  /  Alb  3.7  /  TBili  1.1  /  DBili  x   /  AST  14<L>  /  ALT  26  /  AlkPhos  76  04-15    CARDIAC MARKERS ( 15 Apr 2021 10:23 )  .023 ng/mL / x     / 92 U/L / x     / x              Telemetry: AF -> NSR

## 2021-04-16 NOTE — DISCHARGE NOTE NURSING/CASE MANAGEMENT/SOCIAL WORK - PATIENT PORTAL LINK FT
You can access the FollowMyHealth Patient Portal offered by [facility] by registering at the following website: [email or web address]. By joining [facility]’s FollowMyHealth portal, you will also be able to view your health information using other applications (apps) compatible with our system.

## 2021-04-16 NOTE — DISCHARGE NOTE PROVIDER - HOSPITAL COURSE
FROM ADMISSION H+P:   HPI:  Mr Willson is a 54 yo M presenting from home with palpitations found to have A fib. PMH SIMONE on CPAP, HTN, HLD  Patient seen and examined at bedside.   He states that at approx 3 am today he woke up with palpitations and a feeling of his heart racing. He went back to sleep and then when he woke up he had some dizziness and continued feelings of palpitations so came to ER for evaluation. He denies any known past hx of A fib. He also denies past hx of DM2/CHFMI/TIA/CVA. He denies any associated chest pain. He feels well currently aside form continued feeling of palpitations. Denies light-headedness, dizziness, headaches, nausea, vomiting, chest pain, SOB, abdominal pain, constipation, diarrhea, melena, hematochezia, dysuria.   In ER patient was evaluated by cardiology. He was started on Eliquis, received Digoxin, and started on Cardizem drip.     (15 Apr 2021 11:56)    ---  HOSPITAL COURSE:   Patient received digoxin and was started on a Cardizem ggt. Evaluated by cardiology (Dr. العراقي). TTE done. Patient converted back to NSR in less than 24 hours. Patient asymptomatic. Patient to be discharged on home metoprolol with outpatient cardiology follow up.   ---  CONSULTANTS:   Cardiology (Dr. العراقي)    ---  TIME SPENT:  I, the attending physician, was physically present for the key portions of the evaluation and management (E/M) service provided. The total amount of time spent reviewing the hospital notes, laboratory values, imaging findings, assessing/counseling the patient, discussing with consultant physicians, social work, nursing staff was -- minutes    ---  Primary care provider was made aware of plan for discharge:      [ x ] NO     [  ] YES

## 2021-04-16 NOTE — PROGRESS NOTE ADULT - TIME BILLING
Patient is a 55 year old male with a PMH of SIMONE (on CPAP), HTN, HLD presenting with palpitations found to be in Afib with RVR, now converted back to NSR.     #Afib RVR  Patient presented with new onset Afib with RVR, intially on a cardizem ggt. Converted back to NSR overnight.  Now off of cardizem ggt.   Holding AC at this time given short duration of afib and CHADSVASC of 1  Cardiology following.  Possible d/c today pending echo  Restart metoprolol tomorrow  Monitor on tele    #HLD  Continue home statin    #HTN  Holding antihypertensives.   Continue to monitor BP.  Now that patient is off of cardizem ggt, will monitor BP and likely restart home antihypertensives as needed  Cardiology following.    #SIMONE  Nocturnal CPAP    Thomas Salinas

## 2021-04-16 NOTE — PROGRESS NOTE ADULT - ASSESSMENT
The patient is a 55 year old male with a history of HTN, HL, SIMONE on CPAP who presents with palpitations in the setting of new rapid atrial fibrillation.    Plan:  - Patient converted back to sinus rhythm overnight  - Discontinue diltiazem drip  - Received digoxin load; will remain off of digoxin  - Resume metoprolol succinate 200 mg starting tomorrow  - BP on lower side  - Advised patient to check his BPs tomorrow am; if SBP>130 he can resume his other antihypertensives  - Since [id number] of 1 and episode <24 hours, will discontinue apixaban for now and monitor as outpatient  - Echo pending; if unremarkable patient can be discharged from a cardiac standpoint

## 2021-04-16 NOTE — DISCHARGE NOTE PROVIDER - NSDCMRMEDTOKEN_GEN_ALL_CORE_FT
amLODIPine 5 mg oral tablet: 1 tab(s) orally once a day  aspirin 81 mg oral tablet: 1 tab(s) orally once a day  atorvastatin 80 mg oral tablet: 1 tab(s) orally once a day  doxazosin 2 mg oral tablet: 1 tab(s) orally once a day  ferrous sulfate 325 mg (65 mg elemental iron) oral delayed release tablet: 1 tab(s) orally once a day  metoprolol succinate 200 mg oral tablet, extended release: 1 tab(s) orally once a day  olopatadine 0.1% ophthalmic solution: 1 drop(s) to each affected eye 2 times a day  telmisartan-hydrochlorothiazide 80mg-25mg oral tablet: 1 tab(s) orally once a day

## 2021-09-13 NOTE — ED PROVIDER NOTE - IV ALTEPASE ADMIN HIDDEN
show Lip Wedge Excision Repair Text: Given the location of the defect and the proximity to free margins a full thickness wedge repair was deemed most appropriate.  Using a sterile surgical marker, the appropriate repair was drawn incorporating the defect and placing the expected incisions perpendicular to the vermilion border.  The vermilion border was also meticulously outlined to ensure appropriate reapproximation during the repair.  The area thus outlined was incised through and through with a #15 scalpel blade.  The muscularis and dermis were reaproximated with deep sutures following hemostasis. Care was taken to realign the vermilion border before proceeding with the superficial closure.  Once the vermilion was realigned the superfical and mucosal closure was finished.

## 2021-11-24 ENCOUNTER — OUTPATIENT (OUTPATIENT)
Dept: OUTPATIENT SERVICES | Facility: HOSPITAL | Age: 56
LOS: 1 days | End: 2021-11-24
Payer: COMMERCIAL

## 2021-11-24 DIAGNOSIS — Z98.89 OTHER SPECIFIED POSTPROCEDURAL STATES: Chronic | ICD-10-CM

## 2021-11-24 DIAGNOSIS — D12.6 BENIGN NEOPLASM OF COLON, UNSPECIFIED: ICD-10-CM

## 2021-11-24 DIAGNOSIS — K57.30 DIVERTICULOSIS OF LARGE INTESTINE WITHOUT PERFORATION OR ABSCESS WITHOUT BLEEDING: ICD-10-CM

## 2021-11-24 PROCEDURE — 45378 DIAGNOSTIC COLONOSCOPY: CPT
